# Patient Record
Sex: FEMALE | Race: WHITE | NOT HISPANIC OR LATINO
[De-identification: names, ages, dates, MRNs, and addresses within clinical notes are randomized per-mention and may not be internally consistent; named-entity substitution may affect disease eponyms.]

---

## 2021-12-10 PROBLEM — Z00.00 ENCOUNTER FOR PREVENTIVE HEALTH EXAMINATION: Status: ACTIVE | Noted: 2021-12-10

## 2021-12-16 ENCOUNTER — APPOINTMENT (OUTPATIENT)
Dept: PLASTIC SURGERY | Facility: CLINIC | Age: 36
End: 2021-12-16
Payer: COMMERCIAL

## 2021-12-16 VITALS
HEART RATE: 111 BPM | OXYGEN SATURATION: 99 % | WEIGHT: 259.25 LBS | BODY MASS INDEX: 44.26 KG/M2 | RESPIRATION RATE: 16 BRPM | TEMPERATURE: 98.6 F | SYSTOLIC BLOOD PRESSURE: 146 MMHG | HEIGHT: 64 IN | DIASTOLIC BLOOD PRESSURE: 87 MMHG

## 2021-12-16 DIAGNOSIS — Z86.59 PERSONAL HISTORY OF OTHER MENTAL AND BEHAVIORAL DISORDERS: ICD-10-CM

## 2021-12-16 DIAGNOSIS — Z60.2 PROBLEMS RELATED TO LIVING ALONE: ICD-10-CM

## 2021-12-16 DIAGNOSIS — Z78.9 OTHER SPECIFIED HEALTH STATUS: ICD-10-CM

## 2021-12-16 DIAGNOSIS — Z80.3 FAMILY HISTORY OF MALIGNANT NEOPLASM OF BREAST: ICD-10-CM

## 2021-12-16 DIAGNOSIS — F64.9 GENDER IDENTITY DISORDER, UNSPECIFIED: ICD-10-CM

## 2021-12-16 PROCEDURE — 99204 OFFICE O/P NEW MOD 45 MIN: CPT

## 2021-12-16 SDOH — SOCIAL STABILITY - SOCIAL INSECURITY: PROBLEMS RELATED TO LIVING ALONE: Z60.2

## 2021-12-19 PROBLEM — Z78.9 NON-SMOKER: Status: ACTIVE | Noted: 2021-12-16

## 2021-12-19 PROBLEM — F64.9 GENDER DYSPHORIA: Status: RESOLVED | Noted: 2021-12-16 | Resolved: 2021-12-19

## 2021-12-19 PROBLEM — Z78.9 MODERATE EXERCISE: Status: ACTIVE | Noted: 2021-12-16

## 2021-12-19 PROBLEM — Z80.3 FAMILY HISTORY OF MALIGNANT NEOPLASM OF BREAST: Status: ACTIVE | Noted: 2021-12-16

## 2021-12-19 PROBLEM — Z86.59 HISTORY OF ANXIETY: Status: RESOLVED | Noted: 2021-12-16 | Resolved: 2021-12-19

## 2021-12-19 PROBLEM — Z60.2 LIVES ALONE: Status: ACTIVE | Noted: 2021-12-16

## 2021-12-19 PROBLEM — Z86.59 HISTORY OF ATTENTION DEFICIT HYPERACTIVITY DISORDER (ADHD): Status: RESOLVED | Noted: 2021-12-16 | Resolved: 2021-12-19

## 2021-12-19 RX ORDER — OMEGA-3/DHA/EPA/FISH OIL 300-1000MG
1000 CAPSULE ORAL
Refills: 0 | Status: ACTIVE | COMMUNITY

## 2021-12-19 RX ORDER — MULTIVITAMIN
TABLET ORAL
Refills: 0 | Status: ACTIVE | COMMUNITY

## 2021-12-19 RX ORDER — CETIRIZINE HCL 10 MG
10 TABLET ORAL
Refills: 0 | Status: ACTIVE | COMMUNITY

## 2021-12-19 RX ORDER — CHROMIUM 200 MCG
1000 TABLET ORAL
Refills: 0 | Status: ACTIVE | COMMUNITY

## 2022-02-11 NOTE — ASSESSMENT
[FreeTextEntry1] : The patient is a good candidate for bilateral reduction mammoplasty for gender affirmation.  They express understanding that they are at elevated risk of nipple necrosis due to the length of their breasts, and that they are an increased risk for DVT/pulmonary embolism given their elevated BMI.\par \par I also recommend they obtain preoperative breast imaging due to their proximity to age 39 and family history of breast cancer.

## 2022-02-11 NOTE — HISTORY OF PRESENT ILLNESS
[FreeTextEntry1] : 36-year-old person designated female at birth (Remi; they/them) presents for consultation for a radical breast reduction. They are looking to have significantly smaller breasts in order to help with her gender dysphoria. They are not on testosterone and do not plan to go on it. Binding makes it difficult for them to breathe. They deny any lumps, bumps, or other recent changes in the tissue. They have a remote history of breast imaging but have not had a recent mammogram. They endorse a family history of breast cancer in a paternal aunt in her 60s. They state that nipple sensation is fairly important to him (4 out of 5 importance). 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 They have multiple partners, including Graham Bliss, would be able to assist them after surgery. They live alone but would be able to stay with family and friends after surgery. They are a roller [place] person. They deny nicotine use, deny alcohol use, and regularly use marijuana, but agreed to switch to edibles prior to surgery.

## 2022-02-11 NOTE — PHYSICAL EXAM
[de-identified] : NAD. BMI 44.5 [de-identified] : Normal respiratory effort [de-identified] : No dominant masses, skin changes, nipple retraction, or palpable axillary lymphadenopathy. SN->N distance is 45 cm on the right and 47 cm on the left; width is 24.5 cm on the right and 25 cm on the left; N->IMF is 19.5 cm on the right and 20 cm on the left. There is grade 3 ptosis.

## 2022-02-11 NOTE — REVIEW OF SYSTEMS
[Negative] : Heme/Lymph [de-identified] : Hypertrophic scarring of left knee that required scar tape to improve

## 2022-04-04 ENCOUNTER — OUTPATIENT (OUTPATIENT)
Dept: OUTPATIENT SERVICES | Facility: HOSPITAL | Age: 37
LOS: 1 days | End: 2022-04-04
Payer: COMMERCIAL

## 2022-04-04 VITALS
HEIGHT: 64 IN | RESPIRATION RATE: 18 BRPM | WEIGHT: 250 LBS | DIASTOLIC BLOOD PRESSURE: 81 MMHG | SYSTOLIC BLOOD PRESSURE: 133 MMHG | TEMPERATURE: 99 F | OXYGEN SATURATION: 98 % | HEART RATE: 80 BPM

## 2022-04-04 DIAGNOSIS — Z01.818 ENCOUNTER FOR OTHER PREPROCEDURAL EXAMINATION: ICD-10-CM

## 2022-04-04 DIAGNOSIS — K08.409 PARTIAL LOSS OF TEETH, UNSPECIFIED CAUSE, UNSPECIFIED CLASS: Chronic | ICD-10-CM

## 2022-04-04 DIAGNOSIS — F64.0 TRANSSEXUALISM: ICD-10-CM

## 2022-04-04 DIAGNOSIS — F12.90 CANNABIS USE, UNSPECIFIED, UNCOMPLICATED: ICD-10-CM

## 2022-04-04 DIAGNOSIS — Z98.890 OTHER SPECIFIED POSTPROCEDURAL STATES: Chronic | ICD-10-CM

## 2022-04-04 LAB
ANION GAP SERPL CALC-SCNC: 13 MMOL/L — SIGNIFICANT CHANGE UP (ref 5–17)
BUN SERPL-MCNC: 13 MG/DL — SIGNIFICANT CHANGE UP (ref 7–23)
CALCIUM SERPL-MCNC: 9.6 MG/DL — SIGNIFICANT CHANGE UP (ref 8.4–10.5)
CHLORIDE SERPL-SCNC: 103 MMOL/L — SIGNIFICANT CHANGE UP (ref 96–108)
CO2 SERPL-SCNC: 24 MMOL/L — SIGNIFICANT CHANGE UP (ref 22–31)
CREAT SERPL-MCNC: 0.76 MG/DL — SIGNIFICANT CHANGE UP (ref 0.5–1.3)
EGFR: 103 ML/MIN/1.73M2 — SIGNIFICANT CHANGE UP
GLUCOSE SERPL-MCNC: 115 MG/DL — HIGH (ref 70–99)
HCT VFR BLD CALC: 39.1 % — SIGNIFICANT CHANGE UP (ref 34.5–45)
HGB BLD-MCNC: 12.3 G/DL — SIGNIFICANT CHANGE UP (ref 11.5–15.5)
MCHC RBC-ENTMCNC: 29.9 PG — SIGNIFICANT CHANGE UP (ref 27–34)
MCHC RBC-ENTMCNC: 31.5 GM/DL — LOW (ref 32–36)
MCV RBC AUTO: 94.9 FL — SIGNIFICANT CHANGE UP (ref 80–100)
NRBC # BLD: 0 /100 WBCS — SIGNIFICANT CHANGE UP (ref 0–0)
PLATELET # BLD AUTO: 544 K/UL — HIGH (ref 150–400)
POTASSIUM SERPL-MCNC: 4.1 MMOL/L — SIGNIFICANT CHANGE UP (ref 3.5–5.3)
POTASSIUM SERPL-SCNC: 4.1 MMOL/L — SIGNIFICANT CHANGE UP (ref 3.5–5.3)
RBC # BLD: 4.12 M/UL — SIGNIFICANT CHANGE UP (ref 3.8–5.2)
RBC # FLD: 13.9 % — SIGNIFICANT CHANGE UP (ref 10.3–14.5)
SODIUM SERPL-SCNC: 140 MMOL/L — SIGNIFICANT CHANGE UP (ref 135–145)
WBC # BLD: 9.44 K/UL — SIGNIFICANT CHANGE UP (ref 3.8–10.5)
WBC # FLD AUTO: 9.44 K/UL — SIGNIFICANT CHANGE UP (ref 3.8–10.5)

## 2022-04-04 PROCEDURE — G0463: CPT

## 2022-04-04 PROCEDURE — 36415 COLL VENOUS BLD VENIPUNCTURE: CPT

## 2022-04-04 PROCEDURE — 80048 BASIC METABOLIC PNL TOTAL CA: CPT

## 2022-04-04 PROCEDURE — 85027 COMPLETE CBC AUTOMATED: CPT

## 2022-04-04 NOTE — H&P PST ADULT - HISTORY OF PRESENT ILLNESS
This is a  38 y/o female at birth presents today for bilateral breast reduction mammoplasty for gender affirmation 4/25/22  covid swab to be done at [facility] 4/22/22  denies recent travel or  covid symptoms

## 2022-04-04 NOTE — H&P PST ADULT - FALL HARM RISK - UNIVERSAL INTERVENTIONS
Bed in lowest position, wheels locked, appropriate side rails in place/Call bell, personal items and telephone in reach/Instruct patient to call for assistance before getting out of bed or chair/Non-slip footwear when patient is out of bed/[place] to call system/Physically safe environment - no spills, clutter or unnecessary equipment/Purposeful Proactive Rounding/Room/bathroom lighting operational, light cord in reach

## 2022-04-04 NOTE — H&P PST ADULT - ATTENDING COMMENTS
Case canceled as patient smoked marijuana 6 days ago, against policy that there can be no smoking of marijuana 2 weeks before or 6 weeks after surgery.

## 2022-04-04 NOTE — H&P PST ADULT - NSICDXPASTMEDICALHX_GEN_ALL_CORE_FT
PAST MEDICAL HISTORY:  Adult ADHD     Gender dysphoria in adult      PAST MEDICAL HISTORY:  Adult ADHD     Gender dysphoria in adult     Marijuana use     Morbid obesity

## 2022-04-14 PROBLEM — F12.90 CANNABIS USE, UNSPECIFIED, UNCOMPLICATED: Chronic | Status: ACTIVE | Noted: 2022-04-04

## 2022-04-14 PROBLEM — E66.01 MORBID (SEVERE) OBESITY DUE TO EXCESS CALORIES: Chronic | Status: ACTIVE | Noted: 2022-04-04

## 2022-04-14 PROBLEM — F90.9 ATTENTION-DEFICIT HYPERACTIVITY DISORDER, UNSPECIFIED TYPE: Chronic | Status: ACTIVE | Noted: 2022-04-04

## 2022-04-14 PROBLEM — F64.0 TRANSSEXUALISM: Chronic | Status: ACTIVE | Noted: 2022-04-04

## 2022-04-25 ENCOUNTER — APPOINTMENT (OUTPATIENT)
Dept: PLASTIC SURGERY | Facility: HOSPITAL | Age: 37
End: 2022-04-25

## 2022-04-25 ENCOUNTER — OUTPATIENT (OUTPATIENT)
Dept: OUTPATIENT SERVICES | Facility: HOSPITAL | Age: 37
LOS: 1 days | End: 2022-04-25
Payer: COMMERCIAL

## 2022-04-25 DIAGNOSIS — Z98.890 OTHER SPECIFIED POSTPROCEDURAL STATES: Chronic | ICD-10-CM

## 2022-04-25 DIAGNOSIS — K08.409 PARTIAL LOSS OF TEETH, UNSPECIFIED CAUSE, UNSPECIFIED CLASS: Chronic | ICD-10-CM

## 2022-04-27 DIAGNOSIS — F64.0 TRANSSEXUALISM: ICD-10-CM

## 2022-05-05 ENCOUNTER — APPOINTMENT (OUTPATIENT)
Dept: PLASTIC SURGERY | Facility: CLINIC | Age: 37
End: 2022-05-05

## 2022-06-27 ENCOUNTER — OUTPATIENT (OUTPATIENT)
Dept: OUTPATIENT SERVICES | Facility: HOSPITAL | Age: 37
LOS: 1 days | End: 2022-06-27
Payer: COMMERCIAL

## 2022-06-27 VITALS
SYSTOLIC BLOOD PRESSURE: 127 MMHG | HEIGHT: 64 IN | DIASTOLIC BLOOD PRESSURE: 89 MMHG | WEIGHT: 248.9 LBS | RESPIRATION RATE: 14 BRPM | HEART RATE: 105 BPM | TEMPERATURE: 99 F | OXYGEN SATURATION: 98 %

## 2022-06-27 DIAGNOSIS — F64.0 TRANSSEXUALISM: ICD-10-CM

## 2022-06-27 DIAGNOSIS — K08.409 PARTIAL LOSS OF TEETH, UNSPECIFIED CAUSE, UNSPECIFIED CLASS: Chronic | ICD-10-CM

## 2022-06-27 DIAGNOSIS — Z01.818 ENCOUNTER FOR OTHER PREPROCEDURAL EXAMINATION: ICD-10-CM

## 2022-06-27 DIAGNOSIS — Z98.890 OTHER SPECIFIED POSTPROCEDURAL STATES: Chronic | ICD-10-CM

## 2022-06-27 LAB
ANION GAP SERPL CALC-SCNC: 12 MMOL/L — SIGNIFICANT CHANGE UP (ref 5–17)
BUN SERPL-MCNC: 12 MG/DL — SIGNIFICANT CHANGE UP (ref 7–23)
CALCIUM SERPL-MCNC: 10.1 MG/DL — SIGNIFICANT CHANGE UP (ref 8.4–10.5)
CHLORIDE SERPL-SCNC: 102 MMOL/L — SIGNIFICANT CHANGE UP (ref 96–108)
CO2 SERPL-SCNC: 24 MMOL/L — SIGNIFICANT CHANGE UP (ref 22–31)
CREAT SERPL-MCNC: 0.76 MG/DL — SIGNIFICANT CHANGE UP (ref 0.5–1.3)
EGFR: 103 ML/MIN/1.73M2 — SIGNIFICANT CHANGE UP
GLUCOSE SERPL-MCNC: 107 MG/DL — HIGH (ref 70–99)
HCT VFR BLD CALC: 41.2 % — SIGNIFICANT CHANGE UP (ref 34.5–45)
HGB BLD-MCNC: 12.8 G/DL — SIGNIFICANT CHANGE UP (ref 11.5–15.5)
MCHC RBC-ENTMCNC: 29.4 PG — SIGNIFICANT CHANGE UP (ref 27–34)
MCHC RBC-ENTMCNC: 31.1 GM/DL — LOW (ref 32–36)
MCV RBC AUTO: 94.7 FL — SIGNIFICANT CHANGE UP (ref 80–100)
NRBC # BLD: 0 /100 WBCS — SIGNIFICANT CHANGE UP (ref 0–0)
PLATELET # BLD AUTO: 563 K/UL — HIGH (ref 150–400)
POTASSIUM SERPL-MCNC: 4 MMOL/L — SIGNIFICANT CHANGE UP (ref 3.5–5.3)
POTASSIUM SERPL-SCNC: 4 MMOL/L — SIGNIFICANT CHANGE UP (ref 3.5–5.3)
RBC # BLD: 4.35 M/UL — SIGNIFICANT CHANGE UP (ref 3.8–5.2)
RBC # FLD: 13.4 % — SIGNIFICANT CHANGE UP (ref 10.3–14.5)
SODIUM SERPL-SCNC: 138 MMOL/L — SIGNIFICANT CHANGE UP (ref 135–145)
WBC # BLD: 8.77 K/UL — SIGNIFICANT CHANGE UP (ref 3.8–10.5)
WBC # FLD AUTO: 8.77 K/UL — SIGNIFICANT CHANGE UP (ref 3.8–10.5)

## 2022-06-27 PROCEDURE — 85027 COMPLETE CBC AUTOMATED: CPT

## 2022-06-27 PROCEDURE — 36415 COLL VENOUS BLD VENIPUNCTURE: CPT

## 2022-06-27 PROCEDURE — G0463: CPT

## 2022-06-27 PROCEDURE — 80048 BASIC METABOLIC PNL TOTAL CA: CPT

## 2022-06-27 RX ORDER — SODIUM CHLORIDE 9 MG/ML
1000 INJECTION, SOLUTION INTRAVENOUS
Refills: 0 | Status: DISCONTINUED | OUTPATIENT
Start: 2022-07-18 | End: 2022-08-01

## 2022-06-27 RX ORDER — SODIUM CHLORIDE 9 MG/ML
3 INJECTION INTRAMUSCULAR; INTRAVENOUS; SUBCUTANEOUS EVERY 8 HOURS
Refills: 0 | Status: DISCONTINUED | OUTPATIENT
Start: 2022-07-18 | End: 2022-08-01

## 2022-06-27 NOTE — H&P PST ADULT - NEGATIVE ENMT SYMPTOMS
seasonal/evvironmental allergies- on Zyrtec with relief/no hearing difficulty/no ear pain/no sinus symptoms/no nasal congestion/no nasal discharge/no abnormal taste sensation/no throat pain/no dysphagia

## 2022-06-27 NOTE — H&P PST ADULT - HISTORY OF PRESENT ILLNESS
This is a 36 y/o female at birth presents today for bilateral breast reduction mammoplasty for gender affirmation (They/Them) 7/18/22 with Dr. Mora.  Denies fever, chills, malaise, fatigue, n/v, diarrhea, abdominal pain, sore throat, headaches, nasal congestion, rhinnorhea, ear pain, cough, SOB, chest pain, palpitations, and change in taste/smell.     COVID swab to be done at [facility], trying to change to [place] 7/15/22  Denies recent travel or covid symptoms/infections This is a 36 y/o female at birth presents today for bilateral breast reduction mammoplasty for gender affirmation (preferred pronouns: They/Them) 7/18/22 with Dr. Mora.  Denies fever, chills, malaise, fatigue, n/v, diarrhea, abdominal pain, sore throat, headaches, nasal congestion, rhinnorhea, ear pain, cough, SOB, chest pain, palpitations, and change in taste/smell.     COVID swab to be done at [facility], trying to change to [place] 7/15/22  Denies recent travel or covid symptoms/infections  **Surgery canceled in April d/t marijuanna use; Patient states last marijuanna use to be 6/30/22 (per Dr. Mora)  Patient denies cigarette smoking

## 2022-06-27 NOTE — H&P PST ADULT - ALLERGY TYPES
Food- apples; Indoor/Outdoor allergies: pollen, mold, dander, dust mites, dust, and naman/outdoor environmental allergies/indoor environmental allergies/reactions to food

## 2022-06-27 NOTE — H&P PST ADULT - PROBLEM SELECTOR PLAN 1
-Scheduled for bilateral breast reduction mammoplasty for gender affirmation 7/18/22 with Dr. Mora  -CBC & BMP  -COVID swab scheduled for 7/15/22 at [facility] (patient trying to move to [facility])  -Preop instructions and surgical scrub provided; Patient stated understanding using teach back method   -Adequate time provided for questions and answers   -Patient aware to stop marijuanna use 6/30/22 per Dr. Mora   -Advised to take zyrtec am DOS with sip of water   -Advised to stop Vit D3, COq10, tumeric, fish oil, cinnamon, MVIT, and Aderral 1 week prior to surgery  -Urine pregnancy test upon arrival to unit DOS; specimen cup provided  -Antibiotics and LR ordered per protocol    -LR ordered per protocol

## 2022-06-27 NOTE — H&P PST ADULT - FALL HARM RISK - UNIVERSAL INTERVENTIONS
Bed in lowest position, wheels locked, appropriate side rails in place/Call bell, personal items and telephone in reach/Instruct patient to call for assistance before getting out of bed or chair/Non-slip footwear when patient is out of bed/[place] to call system/Physically safe environment - no spills, clutter or unnecessary equipment/Purposeful Proactive Rounding/Room/bathroom lighting operational, light cord in reach

## 2022-06-27 NOTE — H&P PST ADULT - ACTIVITY
bicyle 10 miles to work per day; roller derby 2-3 x's per week;  weight lift/gym 2-3 times per week; climb 2 flight stairs

## 2022-06-27 NOTE — H&P PST ADULT - NS PRO ABUSE SCREEN SUSPICION NEGLECT YN
Katelyn Pereira     no

## 2022-06-27 NOTE — H&P PST ADULT - NSICDXPASTMEDICALHX_GEN_ALL_CORE_FT
PAST MEDICAL HISTORY:  Adult ADHD     Gender dysphoria in adult     Marijuana use     Morbid obesity      PAST MEDICAL HISTORY:  Adult ADHD     Environmental allergies indoor/outdoor- see below    Gender dysphoria in adult     Marijuana use     Morbid obesity     PCOS (polycystic ovarian syndrome)

## 2022-06-27 NOTE — H&P PST ADULT - NEGATIVE GENERAL SYMPTOMS
no fever/no chills/no sweating/no anorexia/no weight loss/no weight gain no fever/no chills/no sweating/no anorexia/no weight loss/no weight gain/no malaise/no fatigue

## 2022-06-27 NOTE — H&P PST ADULT - MUSCULOSKELETAL
negative well healed surgical scar left knee/ROM intact/no joint swelling/no joint erythema/no joint warmth/normal gait

## 2022-06-27 NOTE — H&P PST ADULT - REASON FOR ADMISSION
"breast reduction" "Breast reduction"  Gender Affirmation Surgery- (Gender at birth: Female)/(Preferred Pronouns: They/Them)  Preferred Name: Remi

## 2022-07-13 DIAGNOSIS — Z01.818 ENCOUNTER FOR OTHER PREPROCEDURAL EXAMINATION: ICD-10-CM

## 2022-07-16 LAB — SARS-COV-2 N GENE NPH QL NAA+PROBE: NOT DETECTED

## 2022-07-17 ENCOUNTER — TRANSCRIPTION ENCOUNTER (OUTPATIENT)
Age: 37
End: 2022-07-17

## 2022-07-18 ENCOUNTER — RESULT REVIEW (OUTPATIENT)
Age: 37
End: 2022-07-18

## 2022-07-18 ENCOUNTER — TRANSCRIPTION ENCOUNTER (OUTPATIENT)
Age: 37
End: 2022-07-18

## 2022-07-18 ENCOUNTER — APPOINTMENT (OUTPATIENT)
Dept: PLASTIC SURGERY | Facility: HOSPITAL | Age: 37
End: 2022-07-18

## 2022-07-18 ENCOUNTER — OUTPATIENT (OUTPATIENT)
Dept: OUTPATIENT SERVICES | Facility: HOSPITAL | Age: 37
LOS: 1 days | End: 2022-07-18
Payer: COMMERCIAL

## 2022-07-18 VITALS
TEMPERATURE: 100 F | WEIGHT: 248.9 LBS | HEIGHT: 64 IN | RESPIRATION RATE: 16 BRPM | OXYGEN SATURATION: 99 % | DIASTOLIC BLOOD PRESSURE: 84 MMHG | SYSTOLIC BLOOD PRESSURE: 138 MMHG | HEART RATE: 98 BPM

## 2022-07-18 VITALS
OXYGEN SATURATION: 96 % | SYSTOLIC BLOOD PRESSURE: 101 MMHG | RESPIRATION RATE: 16 BRPM | DIASTOLIC BLOOD PRESSURE: 55 MMHG | HEART RATE: 90 BPM

## 2022-07-18 DIAGNOSIS — Z98.890 OTHER SPECIFIED POSTPROCEDURAL STATES: Chronic | ICD-10-CM

## 2022-07-18 DIAGNOSIS — F64.0 TRANSSEXUALISM: ICD-10-CM

## 2022-07-18 DIAGNOSIS — K08.409 PARTIAL LOSS OF TEETH, UNSPECIFIED CAUSE, UNSPECIFIED CLASS: Chronic | ICD-10-CM

## 2022-07-18 PROCEDURE — 19318 BREAST REDUCTION: CPT | Mod: 50

## 2022-07-18 PROCEDURE — 88307 TISSUE EXAM BY PATHOLOGIST: CPT

## 2022-07-18 PROCEDURE — C9399: CPT

## 2022-07-18 PROCEDURE — 88307 TISSUE EXAM BY PATHOLOGIST: CPT | Mod: 26

## 2022-07-18 PROCEDURE — C1889: CPT

## 2022-07-18 DEVICE — LIGATING CLIPS WECK HEMOCLIP TITANIUM MEDIUM (BLUE) 25: Type: IMPLANTABLE DEVICE | Status: FUNCTIONAL

## 2022-07-18 RX ORDER — CETIRIZINE HYDROCHLORIDE 10 MG/1
1 TABLET ORAL
Qty: 0 | Refills: 0 | DISCHARGE

## 2022-07-18 RX ORDER — HYDROMORPHONE HYDROCHLORIDE 2 MG/ML
1 INJECTION INTRAMUSCULAR; INTRAVENOUS; SUBCUTANEOUS
Refills: 0 | Status: DISCONTINUED | OUTPATIENT
Start: 2022-07-18 | End: 2022-07-18

## 2022-07-18 RX ORDER — L.ACIDOPH/B.ANIMALIS/B.LONGUM 15B CELL
1 CAPSULE ORAL
Qty: 0 | Refills: 0 | DISCHARGE

## 2022-07-18 RX ORDER — DEXTROAMPHETAMINE SACCHARATE, AMPHETAMINE ASPARTATE, DEXTROAMPHETAMINE SULFATE AND AMPHETAMINE SULFATE 1.875; 1.875; 1.875; 1.875 MG/1; MG/1; MG/1; MG/1
1 TABLET ORAL
Qty: 0 | Refills: 0 | DISCHARGE

## 2022-07-18 RX ORDER — OMEGA-3 ACID ETHYL ESTERS 1 G
1 CAPSULE ORAL
Qty: 0 | Refills: 0 | DISCHARGE

## 2022-07-18 RX ORDER — CHLORHEXIDINE GLUCONATE 213 G/1000ML
1 SOLUTION TOPICAL ONCE
Refills: 0 | Status: COMPLETED | OUTPATIENT
Start: 2022-07-18 | End: 2022-07-18

## 2022-07-18 RX ORDER — CHOLECALCIFEROL (VITAMIN D3) 125 MCG
1 CAPSULE ORAL
Qty: 0 | Refills: 0 | DISCHARGE

## 2022-07-18 RX ORDER — MILK THISTLE 150 MG
2 CAPSULE ORAL
Qty: 0 | Refills: 0 | DISCHARGE

## 2022-07-18 RX ORDER — APREPITANT 80 MG/1
40 CAPSULE ORAL ONCE
Refills: 0 | Status: COMPLETED | OUTPATIENT
Start: 2022-07-18 | End: 2022-07-18

## 2022-07-18 RX ORDER — DIPHENHYDRAMINE HCL 50 MG
12.5 CAPSULE ORAL ONCE
Refills: 0 | Status: DISCONTINUED | OUTPATIENT
Start: 2022-07-18 | End: 2022-08-01

## 2022-07-18 RX ORDER — ONDANSETRON 8 MG/1
4 TABLET, FILM COATED ORAL ONCE
Refills: 0 | Status: COMPLETED | OUTPATIENT
Start: 2022-07-18 | End: 2022-07-18

## 2022-07-18 RX ORDER — LIDOCAINE HCL 20 MG/ML
0.2 VIAL (ML) INJECTION ONCE
Refills: 0 | Status: COMPLETED | OUTPATIENT
Start: 2022-07-18 | End: 2022-07-18

## 2022-07-18 RX ORDER — UBIDECARENONE 100 MG
1 CAPSULE ORAL
Qty: 0 | Refills: 0 | DISCHARGE

## 2022-07-18 RX ORDER — HYDROMORPHONE HYDROCHLORIDE 2 MG/ML
0.5 INJECTION INTRAMUSCULAR; INTRAVENOUS; SUBCUTANEOUS
Refills: 0 | Status: DISCONTINUED | OUTPATIENT
Start: 2022-07-18 | End: 2022-07-18

## 2022-07-18 RX ORDER — CEFAZOLIN SODIUM 1 G
2000 VIAL (EA) INJECTION ONCE
Refills: 0 | Status: COMPLETED | OUTPATIENT
Start: 2022-07-18 | End: 2022-07-18

## 2022-07-18 RX ORDER — OXYCODONE HYDROCHLORIDE 5 MG/1
1 TABLET ORAL
Qty: 15 | Refills: 0
Start: 2022-07-18

## 2022-07-18 RX ADMIN — APREPITANT 40 MILLIGRAM(S): 80 CAPSULE ORAL at 07:24

## 2022-07-18 RX ADMIN — HYDROMORPHONE HYDROCHLORIDE 0.5 MILLIGRAM(S): 2 INJECTION INTRAMUSCULAR; INTRAVENOUS; SUBCUTANEOUS at 15:23

## 2022-07-18 RX ADMIN — ONDANSETRON 4 MILLIGRAM(S): 8 TABLET, FILM COATED ORAL at 15:23

## 2022-07-18 RX ADMIN — SODIUM CHLORIDE 3 MILLILITER(S): 9 INJECTION INTRAMUSCULAR; INTRAVENOUS; SUBCUTANEOUS at 07:25

## 2022-07-18 RX ADMIN — HYDROMORPHONE HYDROCHLORIDE 0.5 MILLIGRAM(S): 2 INJECTION INTRAMUSCULAR; INTRAVENOUS; SUBCUTANEOUS at 15:41

## 2022-07-18 RX ADMIN — CHLORHEXIDINE GLUCONATE 1 APPLICATION(S): 213 SOLUTION TOPICAL at 07:26

## 2022-07-18 RX ADMIN — SODIUM CHLORIDE 100 MILLILITER(S): 9 INJECTION, SOLUTION INTRAVENOUS at 07:24

## 2022-07-18 NOTE — ASU DISCHARGE PLAN (ADULT/PEDIATRIC) - NURSING INSTRUCTIONS
Next dose of Tylenol will be on or after __3pm_________ ,today/tonight and every 6 hours afterwards for pain management, do not take any Tylenol containing products until this time. Your first dose of Tylenol was given at _____9am______. Do not exceed more than 4000mg of Tylenol in one 24 hour setting. .

## 2022-07-18 NOTE — ASU PATIENT PROFILE, ADULT - FALL HARM RISK - UNIVERSAL INTERVENTIONS
Bed in lowest position, wheels locked, appropriate side rails in place/Call bell, personal items and telephone in reach/Instruct patient to call for assistance before getting out of bed or chair/Non-slip footwear when patient is out of bed/[place] to call system/Physically safe environment - no spills, clutter or unnecessary equipment/Purposeful Proactive Rounding/Room/bathroom lighting operational, light cord in reach

## 2022-07-18 NOTE — ASU DISCHARGE PLAN (ADULT/PEDIATRIC) - CARE PROVIDER_API CALL
Chino Mora)  Surgery  [facility]  [address], [address]  [place] [address]  Phone: [phone]  Fax: [phone]  Scheduled Appointment: 07/27/2022

## 2022-07-18 NOTE — ASU DISCHARGE PLAN (ADULT/PEDIATRIC) - ASU DC SPECIAL INSTRUCTIONSFT
NOTIFY YOUR SURGEON IF YOU HAVE: any bleeding that does not stop, any pus draining from your wound(s), any fever (over 100.4 F) persistent nausea/vomiting, or if your pain is not controlled on your discharge pain medications, unable to urinate.    ACTIVITY: Please refrain from increased physical activity for a period of 2 weeks. Please do not lift anything heavier than a gallon of milk or about 5 pounds. Upon follow up you will be given further instructions on how much physical activity you may do.     DRAINS: You will be discharged with AUGUSTUS drains. You will need to empty them and record outputs accurately. This will be taught to you by the nursing staff. Please do not remove the AUGUSTUS drains. They will be removed in the office. Please bring to the office accurate records of output.     Dressing: please leave dressing in place until follow up. Please keep the dressing clean and dry while you recover.     PAIN: Please take 1000mg Tylenol every 6 hours as needed. Please do not exceed 4000mg Tylenol in any 24 hour period. You are being prescribed 5mg oxycodone tablets. Please take 1 every 6 hours as needed for severe post surgical breakthrough pain not controlled by other medications. This prescription has been sent to your specified pharmacy.     Please follow up with Dr. Mora within 1 week of discharge from the hospital. You may call [phone] to schedule an appointment.

## 2022-07-18 NOTE — ASU PATIENT PROFILE, ADULT - NSICDXPASTMEDICALHX_GEN_ALL_CORE_FT
PAST MEDICAL HISTORY:  Adult ADHD     Environmental allergies indoor/outdoor- see below    Gender dysphoria in adult     Marijuana use     Morbid obesity     PCOS (polycystic ovarian syndrome)

## 2022-07-18 NOTE — BRIEF OPERATIVE NOTE - OPERATION/FINDINGS
bilateral superiomedial pedicle breast reduction. Approximately 1kg removed from each side, please refer to operative record for exact weights.

## 2022-07-18 NOTE — ASU PREOP CHECKLIST - SITE MARKED BY SURGEON
----- Message from Reagan Oshea DO sent at 5/15/2018  9:09 AM CDT -----  The pathology results demonstrated Hyperplastic. Removed. No adenoma repeat 5 years history of adenoma  
Patient notified of procedure results and recommendations. Placed on recall.   
bilateral breasts/yes

## 2022-07-18 NOTE — ASU DISCHARGE PLAN (ADULT/PEDIATRIC) - NS MD DC FALL RISK RISK
For information on Fall & Injury Prevention, visit: [email or web address] OR  [email or web address] OR  https://www.cdc.gov/steadi/patient.html

## 2022-07-18 NOTE — ASU PREOP CHECKLIST - BP NONINVASIVE SYSTOLIC (MM HG)
Please call when home to check to see if taking losartan potassium     Please also see what dose and what type of potassium supplement you are taking    It is important for you to show up for 15 minutes prior to your scheduled appointment. It often times takes 15 minutes for the nurse to go through medications, get vitals, and perform other check in processes. Coming early allows you to have the appropriately scheduled time with your physician. If you are late for your appointment you likely will be asked to reschedule.      138

## 2022-07-18 NOTE — ASU DISCHARGE PLAN (ADULT/PEDIATRIC) - BATHING
[facility]  [address] [place] [address] [phone]               Thank you for choosing us for your health care visit with Jazmin Grigsby.  Lexus Shearer MD.  We are glad to serve you and happy to provide you with this summary of your visit It is the patient's responsibility to check with and follow their insurance company's guidelines for prior authorization for this test.  You may be held responsible for payment in full if proper authorization is not acquired.   Please contact the Patient Bu return for a follow-up Blood Pressure Check in 1 month.      Lifestyle Modification Recommendations:    Modification Recommendation   Weight Reduction Maintain normal body weight (body mass index 18.5-24.9 kg/m2)   DASH eating plan Adopt a diet rich in frui 2 ½ hours per week – spread out over time Use a katelyn to keep you motivated   Don’t forget strength training with weights and resistance Set goals and track your progress   You don’t need to join a gym. Home exercises work great.  Put more priority on exe Sponge only

## 2022-07-19 PROBLEM — Z91.09 OTHER ALLERGY STATUS, OTHER THAN TO DRUGS AND BIOLOGICAL SUBSTANCES: Chronic | Status: ACTIVE | Noted: 2022-06-27

## 2022-07-19 PROBLEM — E28.2 POLYCYSTIC OVARIAN SYNDROME: Chronic | Status: ACTIVE | Noted: 2022-06-27

## 2022-07-25 LAB — SURGICAL PATHOLOGY STUDY: SIGNIFICANT CHANGE UP

## 2022-07-27 ENCOUNTER — APPOINTMENT (OUTPATIENT)
Dept: PLASTIC SURGERY | Facility: CLINIC | Age: 37
End: 2022-07-27

## 2022-07-27 PROCEDURE — 99024 POSTOP FOLLOW-UP VISIT: CPT

## 2022-07-27 NOTE — ASSESSMENT
[FreeTextEntry1] : Small spontaneously drained seroma on left.  Otherwise healing appropriately.  No evidence of infection.  Given the restrictions, and supportive garment use were reviewed.  Potential signs of infection were reviewed.  Follow-up in 2 weeks for reassessment.

## 2022-07-27 NOTE — HISTORY OF PRESENT ILLNESS
[FreeTextEntry1] : The patient returns approximately 1 week following bilateral reduction mammoplasty for gender affirmation.  They deny significant discomfort.  Drain output has been less than 10 cc a day on each side.\par \par Pathology demonstrates benign findings.

## 2022-07-27 NOTE — PHYSICAL EXAM
[de-identified] : Dressings and drains removed completely.  Incisions intact.  Spontaneous drainage of seroma fluid on left with drain removal.  Minimal lateral fullness on that side.  NACs appear viable bilaterally.  Sensate on left.

## 2022-07-27 NOTE — REASON FOR VISIT
[Post Op: _________] : a [unfilled] post op visit [Other: _____] : [unfilled] [FreeTextEntry1] : Dop: 7/18/22 S/P: bilateral reduction mammoplasty for gender affirmation

## 2022-08-10 ENCOUNTER — APPOINTMENT (OUTPATIENT)
Dept: PLASTIC SURGERY | Facility: CLINIC | Age: 37
End: 2022-08-10

## 2022-08-10 PROCEDURE — 99024 POSTOP FOLLOW-UP VISIT: CPT

## 2022-08-10 NOTE — PHYSICAL EXAM
[de-identified] : Steri-Strips and Prineo removed.  T point sutures removed.  Small open area at T point on left.  No significant erythema or tenderness.  Incisions otherwise intact.

## 2022-08-10 NOTE — HISTORY OF PRESENT ILLNESS
[FreeTextEntry1] : The patient is 3 weeks following breast reduction for gender affirmation.  They have some drainage from the inferior aspect of the left breast.  They deny significant pain.\par \par Reviewed pathology: Benign.

## 2022-08-10 NOTE — ASSESSMENT
[FreeTextEntry1] : No evidence of infection or collection.  Wound care and supportive garment use was reviewed.  Follow-up in 3 weeks for reassessment.

## 2022-09-01 NOTE — H&P PST ADULT - NSANTHNECKRD_ENT_A_CORE
Get the following vaccines at [facility]:  Shingles  Dtap/Tdap/Td - Tetanus  COVID Booster  Flu Shot   No

## 2022-09-07 ENCOUNTER — APPOINTMENT (OUTPATIENT)
Dept: PLASTIC SURGERY | Facility: CLINIC | Age: 37
End: 2022-09-07

## 2022-09-07 DIAGNOSIS — F64.0 TRANSSEXUALISM: ICD-10-CM

## 2022-09-07 PROCEDURE — 99024 POSTOP FOLLOW-UP VISIT: CPT

## 2022-09-07 NOTE — REASON FOR VISIT
[Post Op: _________] : a [unfilled] post op visit [FreeTextEntry1] : Dop: 7/18/22 S/P: bilateral reduction mammoplasty for gender affirmation.

## 2022-09-07 NOTE — HISTORY OF PRESENT ILLNESS
[FreeTextEntry1] : The patient denies significant issues.  They state they are happy with her result.  They note reasonable sensation/responsiveness of the right nipple.  Less so on the left.  They plan to fletcher their nipples.

## 2022-09-07 NOTE — PHYSICAL EXAM
[de-identified] : Good overall symmetry.  Incisions intact.  Residual chromic sutures removed from periareolar area.

## 2022-11-28 ENCOUNTER — RESULT REVIEW (OUTPATIENT)
Age: 37
End: 2022-11-28

## (undated) DEVICE — ELCTR BOVIE TIP BLADE INSULATED 2.75" EDGE

## (undated) DEVICE — DRSG COMBINE 5X9"

## (undated) DEVICE — FOLEY TRAY 16FR 5CC LTX UMETER CLOSED

## (undated) DEVICE — POSITIONER FOAM EGG CRATE ULNAR 2PCS (PINK)

## (undated) DEVICE — STAPLER SKIN VISI-STAT 35 WIDE

## (undated) DEVICE — SUT POLYSORB 2-0 30" V-20 UNDYED

## (undated) DEVICE — DRAPE TOWEL BLUE 17" X 24"

## (undated) DEVICE — DRAPE 1/2 SHEET 40X57"

## (undated) DEVICE — DRSG TEGADERM 2.5X3"

## (undated) DEVICE — SUT CHROMIC GUT 4-0 18" P-13

## (undated) DEVICE — SUT SOFSILK 2-0 18" TIES

## (undated) DEVICE — BLADE SCALPEL SAFETYLOCK #15

## (undated) DEVICE — DRSG STERISTRIPS 0.5 X 4"

## (undated) DEVICE — PREP CHLORAPREP HI-LITE ORANGE 26ML

## (undated) DEVICE — DRSG MAMMARY SUPPORT XL SIZE 5

## (undated) DEVICE — DRSG BIOPATCH DISK W CHG 1" W 7.0MM HOLE

## (undated) DEVICE — LAP PAD 18 X 18"

## (undated) DEVICE — DRSG XEROFORM 5 X 9"

## (undated) DEVICE — SUT POLYSORB 3-0 18" P-12 UNDYED

## (undated) DEVICE — SPECIMEN CONTAINER 100ML

## (undated) DEVICE — DRSG KLING 6"

## (undated) DEVICE — DRAIN RESERVOIR FOR JACKSON PRATT 100CC CARDINAL

## (undated) DEVICE — SUT CHROMIC 4-0 18" P-12

## (undated) DEVICE — SUT POLYSORB 3-0 30" P-12 UNDYED

## (undated) DEVICE — BLADE SCALPEL SAFETYLOCK #10

## (undated) DEVICE — VENODYNE/SCD SLEEVE CALF LARGE

## (undated) DEVICE — DRSG DERMABOND PRINEO 60CM

## (undated) DEVICE — MEDICATION LABELS W MARKER

## (undated) DEVICE — ONETRAC LIGHTED RETRACTOR 40 X 20MM DISP

## (undated) DEVICE — DRSG ACE BANDAGE 4" NS

## (undated) DEVICE — GLV 8 PROTEXIS (BLUE)

## (undated) DEVICE — WARMING BLANKET LOWER ADULT

## (undated) DEVICE — COTTONBALL LG

## (undated) DEVICE — ONETRAC LIGHTED RETRACTOR 90 X 22MM DISP

## (undated) DEVICE — SUT MONOSOFT 2-0 18" C-15

## (undated) DEVICE — SUT MONOSOF 3-0 18" C-14

## (undated) DEVICE — GLV 7.5 PROTEXIS (WHITE)

## (undated) DEVICE — BASIN AMBULATORY

## (undated) DEVICE — SUT PLAIN GUT FAST ABSORBING 5-0 PC-1

## (undated) DEVICE — ELCTR BOVIE PENCIL SMOKE EVACUATION

## (undated) DEVICE — SOL IRR POUR H2O 250ML

## (undated) DEVICE — ELCTR BOVIE TIP BLADE INSULATED 6.5" EDGE

## (undated) DEVICE — BLADE SCALPEL SAFETYLOCK #11

## (undated) DEVICE — DRSG TELFA 3 X 8

## (undated) DEVICE — DRAPE IOBAN 23" X 23"

## (undated) DEVICE — SUT MONOSOF 4-0 18" C-13

## (undated) DEVICE — GOWN TRIMAX LG

## (undated) DEVICE — SOL IRR POUR NS 0.9% 500ML

## (undated) DEVICE — DRAIN BLAKE 15FR BARD CHANNEL

## (undated) DEVICE — SUT QUILL MONODERM 3-0 30CM PS-2

## (undated) DEVICE — DRAPE INSTRUMENT POUCH 6.75" X 11"

## (undated) DEVICE — PACK BREAST RECONSTRUCTION

## (undated) DEVICE — ONETRAC LIGHTED RETRACTOR 135 X 30MM DISP

## (undated) DEVICE — DRSG ACE BANDAGE 6"

## (undated) DEVICE — SUT SOFSILK 2-0 18" C-23